# Patient Record
Sex: MALE | Race: WHITE | URBAN - METROPOLITAN AREA
[De-identification: names, ages, dates, MRNs, and addresses within clinical notes are randomized per-mention and may not be internally consistent; named-entity substitution may affect disease eponyms.]

---

## 2017-08-01 ENCOUNTER — TRANSFERRED RECORDS (OUTPATIENT)
Dept: HEALTH INFORMATION MANAGEMENT | Facility: CLINIC | Age: 47
End: 2017-08-01

## 2017-09-18 ENCOUNTER — OFFICE VISIT (OUTPATIENT)
Dept: FAMILY MEDICINE | Facility: CLINIC | Age: 47
End: 2017-09-18

## 2017-09-18 VITALS
RESPIRATION RATE: 16 BRPM | BODY MASS INDEX: 30.67 KG/M2 | HEART RATE: 67 BPM | WEIGHT: 235 LBS | SYSTOLIC BLOOD PRESSURE: 137 MMHG | DIASTOLIC BLOOD PRESSURE: 82 MMHG

## 2017-09-18 DIAGNOSIS — R00.2 PALPITATIONS: ICD-10-CM

## 2017-09-18 DIAGNOSIS — R91.1 LUNG NODULE: ICD-10-CM

## 2017-09-18 DIAGNOSIS — R00.2 PALPITATIONS: Primary | ICD-10-CM

## 2017-09-18 LAB
ALBUMIN SERPL-MCNC: 3.9 G/DL (ref 3.4–5)
ALP SERPL-CCNC: 60 U/L (ref 40–150)
ALT SERPL W P-5'-P-CCNC: 37 U/L (ref 0–70)
ANION GAP SERPL CALCULATED.3IONS-SCNC: 5 MMOL/L (ref 3–14)
AST SERPL W P-5'-P-CCNC: 22 U/L (ref 0–45)
BASOPHILS # BLD AUTO: 0 10E9/L (ref 0–0.2)
BASOPHILS NFR BLD AUTO: 0.8 %
BILIRUB SERPL-MCNC: 0.7 MG/DL (ref 0.2–1.3)
BUN SERPL-MCNC: 14 MG/DL (ref 7–30)
CALCIUM SERPL-MCNC: 8.6 MG/DL (ref 8.5–10.1)
CHLORIDE SERPL-SCNC: 104 MMOL/L (ref 94–109)
CHOLEST SERPL-MCNC: 157 MG/DL
CO2 SERPL-SCNC: 28 MMOL/L (ref 20–32)
CREAT SERPL-MCNC: 0.94 MG/DL (ref 0.66–1.25)
DIFFERENTIAL METHOD BLD: NORMAL
EOSINOPHIL # BLD AUTO: 0.2 10E9/L (ref 0–0.7)
EOSINOPHIL NFR BLD AUTO: 4 %
ERYTHROCYTE [DISTWIDTH] IN BLOOD BY AUTOMATED COUNT: 12.4 % (ref 10–15)
GFR SERPL CREATININE-BSD FRML MDRD: 87 ML/MIN/1.7M2
GLUCOSE SERPL-MCNC: 101 MG/DL (ref 70–99)
HCT VFR BLD AUTO: 43.8 % (ref 40–53)
HDLC SERPL-MCNC: 58 MG/DL
HGB BLD-MCNC: 14.7 G/DL (ref 13.3–17.7)
IMM GRANULOCYTES # BLD: 0 10E9/L (ref 0–0.4)
IMM GRANULOCYTES NFR BLD: 0.2 %
LDLC SERPL CALC-MCNC: 66 MG/DL
LYMPHOCYTES # BLD AUTO: 1.6 10E9/L (ref 0.8–5.3)
LYMPHOCYTES NFR BLD AUTO: 33.1 %
MCH RBC QN AUTO: 31.5 PG (ref 26.5–33)
MCHC RBC AUTO-ENTMCNC: 33.6 G/DL (ref 31.5–36.5)
MCV RBC AUTO: 94 FL (ref 78–100)
MONOCYTES # BLD AUTO: 0.6 10E9/L (ref 0–1.3)
MONOCYTES NFR BLD AUTO: 12.7 %
NEUTROPHILS # BLD AUTO: 2.3 10E9/L (ref 1.6–8.3)
NEUTROPHILS NFR BLD AUTO: 49.2 %
NONHDLC SERPL-MCNC: 99 MG/DL
NRBC # BLD AUTO: 0 10*3/UL
NRBC BLD AUTO-RTO: 0 /100
PLATELET # BLD AUTO: 231 10E9/L (ref 150–450)
POTASSIUM SERPL-SCNC: 4.4 MMOL/L (ref 3.4–5.3)
PROT SERPL-MCNC: 7.9 G/DL (ref 6.8–8.8)
RBC # BLD AUTO: 4.67 10E12/L (ref 4.4–5.9)
SODIUM SERPL-SCNC: 138 MMOL/L (ref 133–144)
TRIGL SERPL-MCNC: 162 MG/DL
TSH SERPL DL<=0.005 MIU/L-ACNC: 1.57 MU/L (ref 0.4–4)
WBC # BLD AUTO: 4.7 10E9/L (ref 4–11)

## 2017-09-18 ASSESSMENT — PAIN SCALES - GENERAL: PAINLEVEL: NO PAIN (0)

## 2017-09-18 NOTE — NURSING NOTE
EKG performed  Results to provider  Patient tolerated well. Alia Smith CMA at 10:09 AM on 9/18/2017
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
## 2017-09-18 NOTE — PATIENT INSTRUCTIONS
Primary Care Center Medication Refill Request Information:  * Please contact your pharmacy regarding ANY request for medication refills.  ** [facility] Prescription Fax = [phone]  * Please allow 3 business days for routine medication refills.  * Please allow 5 business days for controlled substance medication refills.     Primary Care Center Test Result notification information:  *You will be notified with in 7-10 days of your appointment day regarding the results of your test.  If you are on MyChart you will be notified as soon as the provider has reviewed the results and signed off on them.    Primary Care Center [phone]     ECHO DEPARTMENT  (EXERCISE)    Name: David Miller  YOB: 1970  MRN: [id number]  DATE:     TIME:     DOES PATIENT HAVE A DEFIBRILLATOR?  No  DOES PATIENT HAVE A PACEMAKER? No  IS PATIENT TAKING ANY BETA-BLOCKERS? No    PREP INFO:        NOTHING TO EAT OR DRINK 3 HOURS PRIOR.    DO NOT TAKE NITRATES ON THE DAY OF YOUR TEST. DO NOT WEAR YOUR NITRO-PATCH.    NO ALCOHOL, SMOKING, OR OTHER TOBACCO FOR 12 HOURS BEFORE THE TEST    WEAR COMFORTABLE CLOTHES AND COMFORTABLE SHOES    STOP BETA-BLOCKERS THE DAY BEFORE AND THE MORNING OF PROCEDURE.      QUESTIONS OR NEED TO RESCHEDULE: CALL [phone]

## 2017-09-18 NOTE — TELEPHONE ENCOUNTER
1.  Date/reason for appt: 17 - Lung Nodules    2.  Referring provider: Dr Jd Del Valle      3.  Call to patient (Yes / No - short description): No - scheduled with Manda from [facility]    4.  Previous care at / records requested from: [facility] - records received & scanned into chart    5.  Recent Imagin17 Heart Scan - report with pictures scanned into chart

## 2017-09-18 NOTE — MR AVS SNAPSHOT
After Visit Summary   9/18/2017    David Miller    MRN: [id number]           Patient Information     Date Of Birth          1970        Visit Information        Provider Department      9/18/2017 8:35 AM Jd Land MD [facility]        Today's Diagnoses     Palpitations    -  1    Lung nodule          Care Instructions    Primary Care Center Medication Refill Request Information:  * Please contact your pharmacy regarding ANY request for medication refills.  ** [facility] Prescription Fax = [phone]  * Please allow 3 business days for routine medication refills.  * Please allow 5 business days for controlled substance medication refills.     Primary Care Center Test Result notification information:  *You will be notified with in 7-10 days of your appointment day regarding the results of your test.  If you are on MyChart you will be notified as soon as the provider has reviewed the results and signed off on them.    Primary Care Center [phone]     ECHO DEPARTMENT  (EXERCISE)    Name: David Miller  YOB: 1970  MRN: [id number]  DATE:     TIME:     DOES PATIENT HAVE A DEFIBRILLATOR?  No  DOES PATIENT HAVE A PACEMAKER? No  IS PATIENT TAKING ANY BETA-BLOCKERS? No    PREP INFO:        NOTHING TO EAT OR DRINK 3 HOURS PRIOR.    DO NOT TAKE NITRATES ON THE DAY OF YOUR TEST. DO NOT WEAR YOUR NITRO-PATCH.    NO ALCOHOL, SMOKING, OR OTHER TOBACCO FOR 12 HOURS BEFORE THE TEST    WEAR COMFORTABLE CLOTHES AND COMFORTABLE SHOES    STOP BETA-BLOCKERS THE DAY BEFORE AND THE MORNING OF PROCEDURE.      QUESTIONS OR NEED TO RESCHEDULE: CALL [phone]          Follow-ups after your visit        Additional Services     CARDIOLOGY EVAL ADULT REFERRAL       Your provider has referred you to:  [facility]: [facility] [phone]   [email or web address]    Please be  aware that coverage of these services is subject to the terms and limitations of your health insurance plan.  Call member services at your health plan with any benefit or coverage questions.      Type of Referral:  New EP Consult    Timeframe requested:  Within 1 month    Please bring the following to your appointment:  >>   Any x-rays, CTs or MRIs which have been performed.  Contact the facility where they were done to arrange for  prior to your scheduled appointment.    >>   List of current medications  >>   This referral request   >>   Any documents/labs given to you for this referral            PULMONARY MEDICINE REFERRAL       Your provider has referred you to: [facility]: [facility] [place] [phone]   [email or web address]    Please be aware that coverage of these services is subject to the terms and limitations of your health insurance plan.  Call member services at your health plan with any benefit or coverage questions.      Please bring the following with you to your appointment:    (1) Any X-Rays, CTs or MRIs which have been performed.  Contact the facility where they were done to arrange for  prior to your scheduled appointment.    (2) List of current medications   (3) This referral request   (4) Any documents/labs given to you for this referral                  Your next 10 appointments already scheduled     Sep 18, 2017 10:30 AM CDT   LAB with  LAB    Health Lab ([facility])    [address] [place] [address] [phone]           Patient must bring picture ID. Patient should be prepared to give a urine specimen  Please do not eat 10-12 hours before your appointment if you are coming in fasting for labs on lipids, cholesterol, or glucose (sugar). Pregnant women should follow their Care Team instructions. Water with medications is okay. Do not drink coffee or other fluids. If you  have concerns about taking  your medications, please ask at office or if scheduling via Virtual Web, send a message by clicking on Secure Messaging, Message Your Care Team.            Sep 19, 2017  3:00 PM CDT   (Arrive by 2:45 PM)   HOLTER MONITOR VISIT with  Cvc Monitor Tech, TH   [facility] ([facility])    [address] [place] [id number] [phone]            Sep 20, 2017  1:00 PM CDT   Ech Stress Test with GIANNI NOVAK STRESS ROOM   [facility] Echo ([facility])    [address] [place] [id number] [phone]           1. Please bring or wear a comfortable two-piece outfit and walking shoes. 2. Stop eating 3 hours before the test. You may drink water or juice. 3. Stop all caffeine 12 hours before the test. This includes coffee, tea, soda pop, chocolate and certain medicines (such as Anacin and Excederin). Also avoid decaf coffee and tea, as these contain small amounts of caffeine. 4. No alcohol, smoking or use of other tobacco products for 12 hours before the test. 5. Refer to your provider instructions to see if you need to stop any medications (such as beta-blockers or nitrates) for this test. 6. For patients with diabetes: - If you take insulin, call your diabetes care team. Ask if you should take a   dose the morning of your test. - If you take diabetes medicine by mouth, dont take it on the morning of your test. Bring it with you to take after the test. (If you have questions, call your diabetes care team) 7. When you arrive, please tell us if: - You have diabetes. - You have taken Viagra, Cialis or Levitra in the past 48 hours. 8. For any questions that cannot be answered, please contact the ordering physician            Sep 27, 2017 10:20 AM CDT   (Arrive by 10:05 AM)   NEW LUNG NODULE with Antwan Tomlin MD   [facility] ([facility])    [address]  2nd Floor  [facility] [address]   [phone]            Oct 03, 2017  1:30 PM CDT   (Arrive by 1:15 PM)   New Patient Visit with Elijah Penn MD   [facility] ([facility])    [address]  3rd Floor  [facility] [address]   [phone]            Oct 09, 2017 10:35 AM CDT   (Arrive by 10:20 AM)   Return Visit with Jd Land MD   [facility] ([facility])    [address]  4th Floor  [facility] [address] [phone]              Future tests that were ordered for you today     Open Future Orders        Priority Expected Expires Ordered    CBC with platelets differential Routine 9/18/2017 10/2/2017 9/18/2017    Lipid panel reflex to direct LDL Routine 9/18/2017 10/2/2017 9/18/2017    Comprehensive metabolic panel Routine 9/18/2017 10/2/2017 9/18/2017    TSH with free T4 reflex Routine 9/18/2017 10/2/2017 9/18/2017    Exercise Stress Echocardiogram Routine  9/18/2018 9/18/2017    Holter Monitor 24 hour - Adult Routine  3/17/2018 9/18/2017            Who to contact     Please call your clinic at [phone] to:    Ask questions about your health    Make or cancel appointments    Discuss your medicines    Learn about your test results    Speak to your doctor   If you have compliments or concerns about an experience at your clinic, or if you wish to file a complaint, please contact [facility] Patient Relations at [phone] or email us at [email or web address]         Additional Information About Your Visit        MyChart Information     [employer] gives you secure access to your electronic health record. If you see a primary care provider, you can also send messages to your care team and make appointments. If you have questions, please call your primary care clinic.  If you do not have a primary care provider, please call [phone] and  they will assist you.      [employer] is an electronic gateway that provides easy, online access to your medical records. With [employer], you can request a clinic appointment, read your test results, renew a prescription or communicate with your care team.     To access your existing account, please contact your [facility] or call [phone] for assistance.        Care EveryWhere ID     This is your Care EveryWhere ID. This could be used by other organizations to access your [place] medical records  [id number]        Your Vitals Were     Pulse Respirations BMI (Body Mass Index)             67 16 30.67 kg/m2          Blood Pressure from Last 3 Encounters:   09/18/17 137/82   11/03/15 155/85   08/05/15 124/83    Weight from Last 3 Encounters:   09/18/17 106.6 kg (235 lb)   11/03/15 101.2 kg (223 lb 1.6 oz)   08/05/15 99 kg (218 lb 3.2 oz)              We Performed the Following     CARDIOLOGY EVAL ADULT REFERRAL     EKG Performed in Clinic w/ Provider Reading Fee     PULMONARY MEDICINE REFERRAL          Today's Medication Changes          These changes are accurate as of: 9/18/17 10:23 AM.  If you have any questions, ask your nurse or doctor.               Stop taking these medicines if you haven't already. Please contact your care team if you have questions.     azithromycin 500 MG tablet   Commonly known as:  ZITHROMAX   Stopped by:  Jd Land MD                    Primary Care Provider Office Phone # Fax #    Jd Land [phone]       [address] [address]        Equal Access to Services     [facility] ZITA AH: Hadii carly brambila hadasho Sokristinali, waaxda luqadaha, qaybta kaalmada prieto, lewis sahni. So [facility] [phone].    ATENCIÓN: Si habla español, tiene a landry disposición servicios gratuitos de asistencia lingüística. Llame al [phone].    We comply with applicable federal civil rights laws and  [place] laws. We do not discriminate on the basis of race, color, national origin, age, disability sex, sexual orientation or gender identity.            Thank you!     Thank you for choosing [facility]  for your care. Our goal is always to provide you with excellent care. Hearing back from our patients is one way we can continue to improve our services. Please take a few minutes to complete the written survey that you may receive in the mail after your visit with us. Thank you!             Your Updated Medication List - Protect others around you: Learn how to safely use, store and throw away your medicines at www.disposemymeds.org.          This list is accurate as of: 9/18/17 10:23 AM.  Always use your most recent med list.                   Brand Name Dispense Instructions for use Diagnosis    finasteride 5 MG tablet    PROSCAR    30 tablet    Take 1/4 tab daily    Dysuria

## 2017-09-19 ENCOUNTER — ALLIED HEALTH/NURSE VISIT (OUTPATIENT)
Dept: CARDIOLOGY | Facility: CLINIC | Age: 47
End: 2017-09-19

## 2017-09-19 DIAGNOSIS — R00.2 PALPITATIONS: ICD-10-CM

## 2017-09-19 LAB — INTERPRETATION ECG - MUSE: NORMAL

## 2017-09-19 PROCEDURE — 93226 XTRNL ECG REC<48 HR SCAN A/R: CPT

## 2017-09-19 PROCEDURE — 93225 XTRNL ECG REC<48 HRS REC: CPT | Mod: ZF

## 2017-09-19 PROCEDURE — 93227 XTRNL ECG REC<48 HR R&I: CPT | Performed by: INTERNAL MEDICINE

## 2017-09-19 NOTE — MR AVS SNAPSHOT
After Visit Summary   9/19/2017    David Miller    MRN: [id number]           Patient Information     Date Of Birth          1970        Visit Information        Provider Department      9/19/2017 3:00 PM Tech, [facility] Cvc Monitor, [facility]        Today's Diagnoses     Palpitations           Follow-ups after your visit        Your next 10 appointments already scheduled     Sep 19, 2017  3:00 PM CDT   (Arrive by 2:45 PM)   HOLTER MONITOR VISIT with  Cvc Monitor Tech, [facility] ([facility])    [address] [place] [address]   [phone]            Sep 20, 2017  1:00 PM CDT   Ech Stress Test with GIANNI NOVAK STRESS ROOM   [facility] ([facility])    [address] [place] [address] [phone]           1. Please bring or wear a comfortable two-piece outfit and walking shoes. 2. Stop eating 3 hours before the test. You may drink water or juice. 3. Stop all caffeine 12 hours before the test. This includes coffee, tea, soda pop, chocolate and certain medicines (such as Anacin and Excederin). Also avoid decaf coffee and tea, as these contain small amounts of caffeine. 4. No alcohol, smoking or use of other tobacco products for 12 hours before the test. 5. Refer to your provider instructions to see if you need to stop any medications (such as beta-blockers or nitrates) for this test. 6. For patients with diabetes: - If you take insulin, call your diabetes care team. Ask if you should take a   dose the morning of your test. - If you take diabetes medicine by mouth, dont take it on the morning of your test. Bring it with you to take after the test. (If you have questions, call your diabetes care team) 7. When you arrive, please tell us if: - You have diabetes. - You have taken Viagra, Cialis or Levitra in the past 48 hours. 8. For any questions that cannot be  answered, please contact the ordering physician            Sep 27, 2017 10:20 AM CDT   (Arrive by 10:05 AM)   NEW LUNG NODULE with Antwan Tomlin MD   [facility] ([facility])    [address]  2nd Floor  [facility] [address] [phone]            Oct 03, 2017  1:30 PM CDT   (Arrive by 1:15 PM)   New Patient Visit with Elijah Penn MD   [facility] ([facility])    [address] [place] [address]  3rd Floor  [facility] [id number] [phone]            Oct 09, 2017 10:35 AM CDT   (Arrive by 10:20 AM)   Return Visit with Jd Land MD   [facility] ([facility])    [address] [place] [address]  [address] [facility] [id number] [phone]              Future tests that were ordered for you today     Open Future Orders        Priority Expected Expires Ordered    Exercise Stress Echocardiogram Routine  9/18/2018 9/18/2017            Who to contact     If you have questions or need follow up information about today's clinic visit or your schedule please contact [facility] directly at [phone].  Normal or non-critical lab and imaging results will be communicated to you by [employer], letter or phone within 4 business days after the clinic has received the results. If you do not hear from us within 7 days, please contact the clinic through [employer] or phone. If you have a critical or abnormal lab result, we will notify you by phone as soon as possible.  Submit refill requests through [employer] or call your pharmacy and they will forward the refill request to us. Please allow 3 business days for your refill to be completed.          Additional Information About Your Visit        [employer] Information     [employer] gives you secure access to your electronic health record. If you see a primary care provider, you can also send messages to your care team and make  appointments. If you have questions, please call your primary care clinic.  If you do not have a primary care provider, please call [phone] and they will assist you.        Care EveryWhere ID     This is your Care EveryWhere ID. This could be used by other organizations to access your [place] medical records  [id number]         Blood Pressure from Last 3 Encounters:   09/18/17 137/82   11/03/15 155/85   08/05/15 124/83    Weight from Last 3 Encounters:   09/18/17 106.6 kg (235 lb)   11/03/15 101.2 kg (223 lb 1.6 oz)   08/05/15 99 kg (218 lb 3.2 oz)              We Performed the Following     Holter Monitor 24 hour - Adult        Primary Care Provider Office Phone # Fax #    Jd Land [phone]       [address] [address]        Equal Access to Services     [facility] : Hadii aad ku hadasho Soomaali, waaxda luqadaha, qaybta kaalmada adeegyada, waxay genaroin hayalvaron alberta english . So [facility] [phone].    ATENCIÓN: Si habla español, tiene a landry disposición servicios gratuitos de asistencia lingüística. Llame al [phone].    We comply with applicable federal civil rights laws and [place] laws. We do not discriminate on the basis of race, color, national origin, age, disability sex, sexual orientation or gender identity.            Thank you!     Thank you for choosing [facility]  for your care. Our goal is always to provide you with excellent care. Hearing back from our patients is one way we can continue to improve our services. Please take a few minutes to complete the written survey that you may receive in the mail after your visit with us. Thank you!             Your Updated Medication List - Protect others around you: Learn how to safely use, store and throw away your medicines at www.disposemymeds.org.          This list is accurate as of: 9/19/17  2:53 PM.  Always use your most recent med list.                   Brand Name Dispense  Instructions for use Diagnosis    finasteride 5 MG tablet    PROSCAR    30 tablet    Take 1/4 tab daily    Dysuria

## 2017-09-19 NOTE — NURSING NOTE
Per David Victor patient to have 24 hour holter monitor placed.  Diagnosis: Palpitations  Monitor placed: Yes  Patient Instructed: Yes  Patient verbalized understanding: Yes  Holter # 8  Card ID: [id number]  Placed By: Na Lassiter MA

## 2017-09-19 NOTE — LETTER
Patient:  David Miller  :   1970  MRN:     [id number]        Mr. David Miller  [address]   [facility] [address]        2017    Dear Mr. Miller,    Thank you for choosing the [facility] for your healthcare needs.  We appreciate the opportunity to serve you.    The following are your recent test results.     Holter monitor shows nothing of significance, which is good     Results for orders placed or performed in visit on 17   Holter Monitor 24 hour - [facility]  [address]  3rd Floor  [facility] [address]  [phone]  2017      Patient:  David Miller  Chart: [id number]  :  1970  Age:  46 year old  Sex:  male       Procedure:  Holter Monitor Placed: please see scanned document for result once interpretation is completed. 24 hour        Technician performing hook-up:  Na Lassiter       Please contact your provider if you have any questions or concerns.  We look forward to serving your needs in the future.      Sincerely,    Dr. Land/castro

## 2017-09-20 ENCOUNTER — RADIANT APPOINTMENT (OUTPATIENT)
Dept: CARDIOLOGY | Facility: CLINIC | Age: 47
End: 2017-09-20
Attending: FAMILY MEDICINE

## 2017-09-20 DIAGNOSIS — R00.2 PALPITATIONS: ICD-10-CM

## 2017-09-20 RX ADMIN — Medication 5 ML: at 13:45

## 2017-09-27 ENCOUNTER — PRE VISIT (OUTPATIENT)
Dept: PULMONOLOGY | Facility: CLINIC | Age: 47
End: 2017-09-27

## 2017-09-27 ENCOUNTER — OFFICE VISIT (OUTPATIENT)
Dept: PULMONOLOGY | Facility: CLINIC | Age: 47
End: 2017-09-27
Attending: INTERNAL MEDICINE

## 2017-09-27 VITALS
TEMPERATURE: 97.7 F | WEIGHT: 238.1 LBS | BODY MASS INDEX: 31.56 KG/M2 | RESPIRATION RATE: 16 BRPM | SYSTOLIC BLOOD PRESSURE: 121 MMHG | HEIGHT: 73 IN | DIASTOLIC BLOOD PRESSURE: 75 MMHG | OXYGEN SATURATION: 97 % | HEART RATE: 65 BPM

## 2017-09-27 DIAGNOSIS — R91.1 LUNG NODULE: ICD-10-CM

## 2017-09-27 PROCEDURE — 99212 OFFICE O/P EST SF 10 MIN: CPT | Mod: ZF

## 2017-09-27 ASSESSMENT — PAIN SCALES - GENERAL: PAINLEVEL: NO PAIN (0)

## 2017-09-27 NOTE — NURSING NOTE
"Oncology Rooming Note    September 27, 2017 10:18 AM   David Miller is a 46 year old male who presents for:    Chief Complaint   Patient presents with     Oncology Clinic Visit     Lung Nodule- New      Initial Vitals: /75  Pulse 65  Temp 97.7  F (36.5  C) (Oral)  Resp 16  Ht 1.864 m (6' 1.39\")  Wt 108 kg (238 lb 1.6 oz)  SpO2 97%  BMI 31.08 kg/m2 Estimated body mass index is 31.08 kg/(m^2) as calculated from the following:    Height as of this encounter: 1.864 m (6' 1.39\").    Weight as of this encounter: 108 kg (238 lb 1.6 oz). Body surface area is 2.36 meters squared.  No Pain (0) Comment: Data Unavailable   No LMP for male patient.  Allergies reviewed: Yes  Medications reviewed: Yes    Medications: Medication refills not needed today.  Pharmacy name entered into [employer]:    [facility] #[id number]  [facility] #[id number] - [address]    Clinical concerns: no new concerns     6 minutes for nursing intake (face to face time)     Corina Gardner CMA    Patient was offered a flu vaccine today but declined.    "

## 2017-09-27 NOTE — TELEPHONE ENCOUNTER
9/27/17 - I called & spoke with Mariann at [facility] again first thing this morning when I didn't see the imaging in PACS. She said that they're still having intermittent issues pushing images, and will try to push the imaging again. I asked her if the imaging disc was ready for  so I could send a  ASA, she informed me that they were just now going to create the imaging disc I requested yesterday. I will send a  as soon as it is ready.    9/26/17 - Faxed 3rd request to [facility] for imaging. Also called [facility] & spoke with Mariann who stated that they are still having issues pushing images, but the issue should be resolved by 6pm. I asked her to create an imaging disc and I will send a  first thing in the morning if we still haven't received imaging in PACS. She will make sure imaging is pushed by 730am. I will check status first thing in the morning. Message & email sent to Livier & Verónica.

## 2017-09-27 NOTE — LETTER
9/27/2017       RE: David Miller  [address]   [place] [address]     Dear Colleague,    Thank you for referring your patient, David Miller, to the [facility]IC at [facility]. Please see a copy of my visit note below.    [facility]  Lung Nodule Clinic Note  September 27, 2017    Chief complaint:  David Miller is a 46 year old male seen in the Pulmonary Clinic  for   Chief Complaint   Patient presents with     Oncology Clinic Visit     Lung Nodule- New      Assessment and Plan:  #1 indeterminate pulmonary nodules seen on cardiac CT scan. We discussed possible etiologies of pulmonary nodules and possible need for surveillance in size.Patient has history of smoking for 15 years pack-a-day on average and quit 5 years ago. He also worked in a [employer] business and exposed to secondhand smoke also. He has no history upon the problems in him or his family. No history of exposure to chemicals, radiation, asbestos.    We will obtain a dedicated CT scan of the chest today and call him back if further surveillance CT is needed or not.    CT reviewed and no need for further study based on Fleischner Criteria.     I spent more than 30 minutes face to face and greater than 50% of time was for counseling and coordination of care about pulm nodules.    History of Present Illness:  This is a 46 years old gentleman recently had heart calcium scoring CT scan revealing a 5 mm pulmonary nodule in the left upper lobe. No other respiratory problems or he has no respiratory symptoms. He is here today to discuss his CT findings.    Exposure history: Asbestos;  No , TB;  No , Radiation;   No     Allergies   Allergen Reactions     Penicillins Rash        No past medical history on file.     No past surgical history on file.     Social History     Social History     Marital status:      Spouse name: N/A     Number of children: N/A     Years of  education: N/A     Occupational History     Not on file.     Social History Main Topics     Smoking status: Former Smoker     Quit date: 7/20/2012     Smokeless tobacco: Never Used     Alcohol use Not on file     Drug use: Not on file     Sexual activity: Not on file     Other Topics Concern     Not on file     Social History Narrative        No family history on file.     Immunization History   Administered Date(s) Administered     HEPA 11/03/2015     Tdap (Adacel,Boostrix) 09/14/2009     Typhoid IM 11/03/2015       Current Outpatient Prescriptions   Medication Sig     finasteride (PROSCAR) 5 MG tablet Take 1/4 tab daily     No current facility-administered medications for this visit.         Review of Systems:  I have done 10 points of review systems and pertinent findings are  ,otherwise negative.    Physical examination  Constitutional: Alert, oriented, not in distress  Vitals: B/P: 121/75, T: 97.7, P: 65, R: 16  Eyes: No icterus, nystagmus, pupils isocoric   Musculoskeletal: Normal muscle mass, no dephormity  Integumentary:  No rash, normal texture and turgor, no edema  Neurological: Alert, orientedx3, no motor deficits, cranial nerves grossly normal  Psychiatric:  Mood and affect are appropriate with insight into his/her medical condition  Hematologic/Immunologic/Lymphatic: No bruise, no lymph node enargement     Data:  Lab Results   Component Value Date    WBC 4.7 09/18/2017     Lab Results   Component Value Date    RBC 4.67 09/18/2017     Lab Results   Component Value Date    HGB 14.7 09/18/2017     Lab Results   Component Value Date    HCT 43.8 09/18/2017     Lab Results   Component Value Date    MCV 94 09/18/2017     Lab Results   Component Value Date    MCH 31.5 09/18/2017     Lab Results   Component Value Date    MCHC 33.6 09/18/2017     Lab Results   Component Value Date    RDW 12.4 09/18/2017     Lab Results   Component Value Date     09/18/2017       Lab Results   Component Value Date      09/18/2017      Lab Results   Component Value Date    POTASSIUM 4.4 09/18/2017     Lab Results   Component Value Date    CHLORIDE 104 09/18/2017     Lab Results   Component Value Date    DIANA 8.6 09/18/2017     Lab Results   Component Value Date    CO2 28 09/18/2017     Lab Results   Component Value Date    BUN 14 09/18/2017     Lab Results   Component Value Date    CR 0.94 09/18/2017     Lab Results   Component Value Date     09/18/2017       Thank you for allowing me participate in the care of David Miller.    TRACE Tomlin MD

## 2017-09-27 NOTE — TELEPHONE ENCOUNTER
9/27/17 0948am - Imaging received via push from [facility] & is viewable in PACS. Message sent to Verónica Maier.

## 2017-09-27 NOTE — PROGRESS NOTES
[facility]  Lung Nodule Clinic Note  September 27, 2017    Chief complaint:  David Miller is a 46 year old male seen in the Pulmonary Clinic  for   Chief Complaint   Patient presents with     Oncology Clinic Visit     Lung Nodule- New      Assessment and Plan:  #1 indeterminate pulmonary nodules seen on cardiac CT scan. We discussed possible etiologies of pulmonary nodules and possible need for surveillance in size.Patient has history of smoking for 15 years pack-a-day on average and quit 5 years ago. He also worked in a [employer] business and exposed to secondhand smoke also. He has no history upon the problems in him or his family. No history of exposure to chemicals, radiation, asbestos.    We will obtain a dedicated CT scan of the chest today and call him back if further surveillance CT is needed or not.    CT reviewed and no need for further study based on Fleischner Criteria.     I spent more than 30 minutes face to face and greater than 50% of time was for counseling and coordination of care about pulm nodules.    History of Present Illness:  This is a 46 years old gentleman recently had heart calcium scoring CT scan revealing a 5 mm pulmonary nodule in the left upper lobe. No other respiratory problems or he has no respiratory symptoms. He is here today to discuss his CT findings.    Exposure history: Asbestos;  No , TB;  No , Radiation;   No     Allergies   Allergen Reactions     Penicillins Rash        No past medical history on file.     No past surgical history on file.     Social History     Social History     Marital status:      Spouse name: N/A     Number of children: N/A     Years of education: N/A     Occupational History     Not on file.     Social History Main Topics     Smoking status: Former Smoker     Quit date: 7/20/2012     Smokeless tobacco: Never Used     Alcohol use Not on file     Drug use: Not on file     Sexual activity: Not on file     Other Topics Concern     Not on file      Social History Narrative        No family history on file.     Immunization History   Administered Date(s) Administered     HEPA 11/03/2015     Tdap (Adacel,Boostrix) 09/14/2009     Typhoid IM 11/03/2015       Current Outpatient Prescriptions   Medication Sig     finasteride (PROSCAR) 5 MG tablet Take 1/4 tab daily     No current facility-administered medications for this visit.         Review of Systems:  I have done 10 points of review systems and pertinent findings are  ,otherwise negative.    Physical examination  Constitutional: Alert, oriented, not in distress  Vitals: B/P: 121/75, T: 97.7, P: 65, R: 16  Eyes: No icterus, nystagmus, pupils isocoric   Musculoskeletal: Normal muscle mass, no dephormity  Integumentary:  No rash, normal texture and turgor, no edema  Neurological: Alert, orientedx3, no motor deficits, cranial nerves grossly normal  Psychiatric:  Mood and affect are appropriate with insight into his/her medical condition  Hematologic/Immunologic/Lymphatic: No bruise, no lymph node enargement     Data:  Lab Results   Component Value Date    WBC 4.7 09/18/2017     Lab Results   Component Value Date    RBC 4.67 09/18/2017     Lab Results   Component Value Date    HGB 14.7 09/18/2017     Lab Results   Component Value Date    HCT 43.8 09/18/2017     Lab Results   Component Value Date    MCV 94 09/18/2017     Lab Results   Component Value Date    MCH 31.5 09/18/2017     Lab Results   Component Value Date    MCHC 33.6 09/18/2017     Lab Results   Component Value Date    RDW 12.4 09/18/2017     Lab Results   Component Value Date     09/18/2017       Lab Results   Component Value Date     09/18/2017      Lab Results   Component Value Date    POTASSIUM 4.4 09/18/2017     Lab Results   Component Value Date    CHLORIDE 104 09/18/2017     Lab Results   Component Value Date    DIANA 8.6 09/18/2017     Lab Results   Component Value Date    CO2 28 09/18/2017     Lab Results   Component Value Date     BUN 14 09/18/2017     Lab Results   Component Value Date    CR 0.94 09/18/2017     Lab Results   Component Value Date     09/18/2017       Thank you for allowing me participate in the care of David Miller.    TRACE Tomlin MD

## 2017-09-27 NOTE — MR AVS SNAPSHOT
After Visit Summary   9/27/2017    David Miller    MRN: [id number]           Patient Information     Date Of Birth          1970        Visit Information        Provider Department      9/27/2017 10:20 AM Antwan Tomlin MD [facility]        Today's Diagnoses     Lung nodule           Follow-ups after your visit        Your next 10 appointments already scheduled     Oct 03, 2017  1:30 PM CDT   (Arrive by 1:15 PM)   New Patient Visit with Elijah Penn MD   [facility] ([facility])    [address] [place] [address]  3rd [facility] [id number] [phone]            Oct 09, 2017 10:35 AM CDT   (Arrive by 10:20 AM)   Return Visit with Jd Land MD   [facility] ([facility])    [address] [place] [address]  4th [facility] [address] [phone]              Who to contact     If you have questions or need follow up information about today's clinic visit or your schedule please contact [facility] directly at [phone].  Normal or non-critical lab and imaging results will be communicated to you by "[employer]hart, letter or phone within 4 business days after the clinic has received the results. If you do not hear from us within 7 days, please contact the clinic through "[employer]hart or phone. If you have a critical or abnormal lab result, we will notify you by phone as soon as possible.  Submit refill requests through [employer] or call your pharmacy and they will forward the refill request to us. Please allow 3 business days for your refill to be completed.          Additional Information About Your Visit        MyChart Information     [employer] gives you secure access to your electronic health record. If you see a primary care provider, you can also send messages to your care team and make appointments. If you have questions, please call your primary care  "clinic.  If you do not have a primary care provider, please call [phone] and they will assist you.        Care EveryWhere ID     This is your Care EveryWhere ID. This could be used by other organizations to access your [place] medical records  [id number]        Your Vitals Were     Pulse Temperature Respirations Height Pulse Oximetry BMI (Body Mass Index)    65 97.7  F (36.5  C) (Oral) 16 1.864 m (6' 1.39\") 97% 31.08 kg/m2       Blood Pressure from Last 3 Encounters:   09/27/17 121/75   09/18/17 137/82   11/03/15 155/85    Weight from Last 3 Encounters:   09/27/17 108 kg (238 lb 1.6 oz)   09/18/17 106.6 kg (235 lb)   11/03/15 101.2 kg (223 lb 1.6 oz)               Primary Care Provider Office Phone # Fax #    Jd Land [phone]       [address] [address]        Equal Access to Services     [facility]: Hadii carly ku hadasho Soomaali, waaxda luqadaha, qaybta kaalmada adeemeraldyadanni, lewis egnlish . So [facility] [phone].    ATENCIÓN: Si habla español, tiene a landry disposición servicios gratuitos de asistencia lingüística. Llame al [phone].    We comply with applicable federal civil rights laws and [place] laws. We do not discriminate on the basis of race, color, national origin, age, disability, sex, sexual orientation, or gender identity.            Thank you!     Thank you for choosing [facility]  for your care. Our goal is always to provide you with excellent care. Hearing back from our patients is one way we can continue to improve our services. Please take a few minutes to complete the written survey that you may receive in the mail after your visit with us. Thank you!             Your Updated Medication List - Protect others around you: Learn how to safely use, store and throw away your medicines at www.disposemymeds.org.          This list is accurate as of: 9/27/17 11:59 PM.  Always use your most recent " med list.                   Brand Name Dispense Instructions for use Diagnosis    finasteride 5 MG tablet    PROSCAR    30 tablet    Take 1/4 tab daily    Dysuria

## 2017-09-28 ENCOUNTER — TELEPHONE (OUTPATIENT)
Dept: SURGERY | Facility: CLINIC | Age: 47
End: 2017-09-28

## 2017-09-28 NOTE — TELEPHONE ENCOUNTER
Pt seen by Dr. Tomlin in nodule clinic 09/27/17. Scan prior to the appt was a heart scan that did not show complete images of lungs.  We obtained a dedicated chest CT after his appt with Dr. Tomlin.  The results were reviewed and per radiology report nodules sub 6 centimeter.  Given the pt is not considered to be high risk for lung ca--per guideline recommendations, no further serial CT follow up is needed.    Spoke with pt. Questions answered.    Verónica Angel, APRN, CNP  Thoracic and Forgut Surgery  [facility]

## 2017-10-02 ENCOUNTER — PRE VISIT (OUTPATIENT)
Dept: CARDIOLOGY | Facility: CLINIC | Age: 47
End: 2017-10-02

## 2017-10-03 ENCOUNTER — OFFICE VISIT (OUTPATIENT)
Dept: CARDIOLOGY | Facility: CLINIC | Age: 47
End: 2017-10-03
Attending: INTERNAL MEDICINE

## 2017-10-03 VITALS
HEART RATE: 73 BPM | DIASTOLIC BLOOD PRESSURE: 86 MMHG | HEIGHT: 74 IN | SYSTOLIC BLOOD PRESSURE: 131 MMHG | OXYGEN SATURATION: 94 % | WEIGHT: 237.5 LBS | BODY MASS INDEX: 30.48 KG/M2

## 2017-10-03 DIAGNOSIS — I49.3 PVC'S (PREMATURE VENTRICULAR CONTRACTIONS): Primary | ICD-10-CM

## 2017-10-03 DIAGNOSIS — R00.2 PALPITATIONS: ICD-10-CM

## 2017-10-03 PROCEDURE — 99212 OFFICE O/P EST SF 10 MIN: CPT | Mod: ZF

## 2017-10-03 PROCEDURE — 99204 OFFICE O/P NEW MOD 45 MIN: CPT | Mod: ZP | Performed by: INTERNAL MEDICINE

## 2017-10-03 ASSESSMENT — PAIN SCALES - GENERAL: PAINLEVEL: NO PAIN (0)

## 2017-10-03 NOTE — NURSING NOTE
Chief Complaint   Patient presents with     New Patient     consult palpitations, had stress echo & holter done (getting holter report not in chart yet)     Vitals were taken and medications were reconciled.  DANA Guaman  1:17 PM

## 2017-10-03 NOTE — PATIENT INSTRUCTIONS
You were seen at the [facility] clinic today.  You saw Dr. Penn  Here are your Instructions:    1. See us back as needed.      Angie Stinson RN  Nurse Care Coordinator  Office:  [phone] option #1, then #3 & ask for Angie (nurse line)  Fax:  [phone]  After Hours:  [phone]  Appointments:  [phone] option #1, then option #1

## 2017-10-03 NOTE — PROGRESS NOTES
"Chief complaint: Palpitations    HPI:   David Miller is a 46 year old male with no prior cardiac history who presents for evaluation of palpitations.     He was in his usual state of health until 4-5 months ago, when he began experiencing a sensation of pounding/\"skipped beats\" predominantly in his right neck. These episodes occur predictably in recovery from exercise (including running and after sexual intercourse) and in the heat, last several seconds (\"just 1 or 2 beats\"), and resolve spontaneously. He has never experienced palpitations during exercise. He was concerned that these may be dangerous and this has prompted him to stop exercising-- he was previously an active runner. He denies ever experiencing lightheadedness, syncope, or chest pain with these episodes.    Initial workup by his PCP, Dr. Land has included ECG (9/18: sinus, VR 72,  QRS 98 QTc 433), exercise echocardiogram (normal, no arrhythmias reproduced--see below), and Holter monitor (official results pending-- see below.)    He states that palpitations did not occur either during or after exercise echocardiogram. He did have palpitations typical of his usual symptoms during the Holter monitor and noted these in the diary.    He has no personal cardiac history, and no family history of premature CAD, arrhythmia, sudden death, drowning, or single car accidents.      He denies any chest pain, dyspnea at rest or with exertion, PND, orthopnea, peripheral edema, lightheadedness or syncope.       PAST MEDICAL HISTORY:  No past medical history on file.    CURRENT MEDICATIONS:  Current Outpatient Prescriptions   Medication Sig Dispense Refill     finasteride (PROSCAR) 5 MG tablet Take 1/4 tab daily 30 tablet 0       PAST SURGICAL HISTORY:  No past surgical history on file.    ALLERGIES:     Allergies   Allergen Reactions     Penicillins Rash       FAMILY HISTORY:  No family history on file.  No family history of premature CAD or sudden " "death.    SOCIAL HISTORY:  Social History   Substance Use Topics     Smoking status: Former Smoker     Quit date: 7/20/2012     Smokeless tobacco: Never Used     Alcohol use Not on file       ROS:   A comprehensive 14 point review of systems is negative other than as mentioned in HPI.    Exam:  /86 (BP Location: Left arm, Patient Position: Chair, Cuff Size: Adult Large)  Pulse 73  Ht 1.88 m (6' 2\")  Wt 107.7 kg (237 lb 8 oz)  SpO2 94%  BMI 30.49 kg/m2  GENERAL APPEARANCE: healthy, alert and no distress  EYES: no icterus, no xanthelasmas  ENT: normal palate, mucosa moist, no central cyanosis  NECK: no adenopathy, no asymmetry, masses, or scars, thyroid normal to palpation and no bruits, JVP not elevated  RESPIRATORY: lungs clear to auscultation - no rales, rhonchi or wheezes, no use of accessory muscles, no retractions, respirations are unlabored, normal respiratory rate  CARDIOVASCULAR: regular rhythm, normal S1 with physiologic split S2, no S3 or S4 and no murmur, click or rub, precordium quiet with normal PMI.  GI: soft, non tender, without hepatosplenomegaly, no masses palpable, bowel sounds normal, aorta not enlarged by palpation, no abdominal bruits  EXTREMITIES: peripheral pulses normal, no edema, no bruits  NEURO: alert and oriented to person/place/time, normal speech, gait and affect  VASC: Radial, femoral, dorsalis pedis and posterior tibialis pulses 2+ bilaterally.  SKIN: no ecchymoses, no rashes.  PSYCH: cooperative, affect appropriate.     Labs:  Reviewed.     I personally reviewed and interpreted:  ECG 9/18/17: Sinus, VR 72  QRS 98 QTc 433.     Exercise Stress echo 9/20/17: Normal exercise echocardiogram without evidence of ischemia. Resting LVEF=60-65%, increased appropriately to >70%. Normal RV function. No significant valvular abnormalities. Normal HR (peak , target 150) and BP response. Normal functional capacity (16:03, 225W.) No angina elicited.     Holter monitor 9/2017: " "Sinus, average VR 87 [], 23 monomorphic PVCs (<1%); no significant tachy/bradyarrhythmias or pauses. 1 patient-triggered event for \"skipped beats\" which correlates to sinus rhythm with 1 isolated PVC.     Obtained and reviewed outside records:  Coronary calcium score ([facility]ott 8/1/17): Agatston score 0. Incidental finding of pulmonary nodule (PCP following.)      Assessment and Plan:   1. PVCs, newly diagnosed:   Rare (<1%) symptomatic monomorphic PVCs. These correlate with symptoms of palpitations on Holter monitor. No exertional arrhythmia, structural disease, or ischemia on exercise echocardiogram.  Explained the mechanism of PVCs and counseled at length regarding their generally benign nature (as well as warning signs of more serious arrhythmia-- exertional palpitations, presyncope/syncope, protracted episodes of palpitations, palpitations associated with chest pain, dramatic increase in frequency/severity, etc.)   Discussed the potential management options including observation, medications (beta blockers or antiarrhythmics), and ablation. Counseled that given his very low PVC burden, observation would be the recommended therapy in the absence of very severe/intolerable symptoms. Advised that he can and should safely return to exercise.     He is agreeable to observation at this time and understands that if palpitations become more frequent or bothersome, or if he develops any of the warning signs described above, that he should follow up immediately.     Further cardiology follow up can be on an as-needed basis.    >50% of this 60-minute visit were spent with the patient on in-person counseling and discussion regarding PVCs, including prognosis, management options, and warning signs of ventricular tachycardia.     The patient states understanding and is agreeable with plan.     Elijah Penn MD  Cardiology    [facility]  [facility]        "

## 2017-10-03 NOTE — MR AVS SNAPSHOT
After Visit Summary   10/3/2017    Davdi Miller    MRN: [id number]           Patient Information     Date Of Birth          1970        Visit Information        Provider Department      10/3/2017 1:30 PM Elijah Penn MD [facility]        Today's Diagnoses     PVC's (premature ventricular contractions)    -  1    Palpitations          Care Instructions    You were seen at the [facility] today.  You saw Dr. Penn  Here are your Instructions:    1. See us back as needed.      Angie Stinson RN  Nurse Care Coordinator  Office:  [phone] option #1, then #3 & ask for Angie (nurse line)  Fax:  [phone]  After Hours:  [phone]  Appointments:  [phone] option #1, then option #1                        Follow-ups after your visit        Your next 10 appointments already scheduled     Oct 09, 2017 10:35 AM CDT   (Arrive by 10:20 AM)   Return Visit with Jd Land MD   [facility] ([facility])    [address] [place] [id number] [phone]              Who to contact     If you have questions or need follow up information about today's clinic visit or your schedule please contact [facility] directly at [phone].  Normal or non-critical lab and imaging results will be communicated to you by MyChart, letter or phone within 4 business days after the clinic has received the results. If you do not hear from us within 7 days, please contact the clinic through MyChart or phone. If you have a critical or abnormal lab result, we will notify you by phone as soon as possible.  Submit refill requests through [employer] or call your pharmacy and they will forward the refill request to us. Please allow 3 business days for your refill to be completed.          Additional Information About Your Visit        MyChart Information     [employer] gives  "you secure access to your electronic health record. If you see a primary care provider, you can also send messages to your care team and make appointments. If you have questions, please call your primary care clinic.  If you do not have a primary care provider, please call [phone] and they will assist you.        Care EveryWhere ID     This is your Care EveryWhere ID. This could be used by other organizations to access your [facility] medical records  [id number]        Your Vitals Were     Pulse Height Pulse Oximetry BMI (Body Mass Index)          73 1.88 m (6' 2\") 94% 30.49 kg/m2         Blood Pressure from Last 3 Encounters:   10/03/17 131/86   09/27/17 121/75   09/18/17 137/82    Weight from Last 3 Encounters:   10/03/17 107.7 kg (237 lb 8 oz)   09/27/17 108 kg (238 lb 1.6 oz)   09/18/17 106.6 kg (235 lb)              Today, you had the following     No orders found for display       Primary Care Provider Office Phone # Fax #    Jd Land [phone]       [address] [facility] [address]  [facility] [address]        Equal Access to Services     MARNI AHUMADA AH: Hadii aad patty nobleo Sokristinali, waaxda luqadaha, qaybta kaalmada adeegyada, lewis sahni. So [facility] [phone].    ATENCIÓN: Si habla español, tiene a landry disposición servicios gratuitos de asistencia lingüística. Llame al [phone].    We comply with applicable federal civil rights laws and [place] laws. We do not discriminate on the basis of race, color, national origin, age, disability, sex, sexual orientation, or gender identity.            Thank you!     Thank you for choosing [facility]  for your care. Our goal is always to provide you with excellent care. Hearing back from our patients is one way we can continue to improve our services. Please take a few minutes to complete the written survey that you may receive in the mail after your visit with us. Thank you!             Your Updated " Medication List - Protect others around you: Learn how to safely use, store and throw away your medicines at www.disposemymeds.org.          This list is accurate as of: 10/3/17  1:55 PM.  Always use your most recent med list.                   Brand Name Dispense Instructions for use Diagnosis    finasteride 5 MG tablet    PROSCAR    30 tablet    Take 1/4 tab daily    Dysuria

## 2017-10-03 NOTE — LETTER
"10/3/2017      RE: David Miller  [address]   [facility] [address]       Dear Colleague,    Thank you for the opportunity to participate in the care of your patient, David Miller, at the [facility]. Please see a copy of my visit note below.    Chief complaint: Palpitations    HPI:   David Miller is a 46 year old male with no prior cardiac history who presents for evaluation of palpitations.     He was in his usual state of health until 4-5 months ago, when he began experiencing a sensation of pounding/\"skipped beats\" predominantly in his right neck. These episodes occur predictably in recovery from exercise (including running and after sexual intercourse) and in the heat, last several seconds (\"just 1 or 2 beats\"), and resolve spontaneously. He has never experienced palpitations during exercise. He was concerned that these may be dangerous and this has prompted him to stop exercising-- he was previously an active runner. He denies ever experiencing lightheadedness, syncope, or chest pain with these episodes.    Initial workup by his PCP, Dr. Land has included ECG (9/18: sinus, VR 72,  QRS 98 QTc 433), exercise echocardiogram (normal, no arrhythmias reproduced--see below), and Holter monitor (official results pending-- see below.)    He states that palpitations did not occur either during or after exercise echocardiogram. He did have palpitations typical of his usual symptoms during the Holter monitor and noted these in the diary.    He has no personal cardiac history, and no family history of premature CAD, arrhythmia, sudden death, drowning, or single car accidents.      He denies any chest pain, dyspnea at rest or with exertion, PND, orthopnea, peripheral edema, lightheadedness or syncope.       PAST MEDICAL HISTORY:  No past medical history on file.    CURRENT MEDICATIONS:  Current Outpatient Prescriptions " "  Medication Sig Dispense Refill     finasteride (PROSCAR) 5 MG tablet Take 1/4 tab daily 30 tablet 0       PAST SURGICAL HISTORY:  No past surgical history on file.    ALLERGIES:     Allergies   Allergen Reactions     Penicillins Rash       FAMILY HISTORY:  No family history on file.  No family history of premature CAD or sudden death.    SOCIAL HISTORY:  Social History   Substance Use Topics     Smoking status: Former Smoker     Quit date: 7/20/2012     Smokeless tobacco: Never Used     Alcohol use Not on file       ROS:   A comprehensive 14 point review of systems is negative other than as mentioned in HPI.    Exam:  /86 (BP Location: Left arm, Patient Position: Chair, Cuff Size: Adult Large)  Pulse 73  Ht 1.88 m (6' 2\")  Wt 107.7 kg (237 lb 8 oz)  SpO2 94%  BMI 30.49 kg/m2  GENERAL APPEARANCE: healthy, alert and no distress  EYES: no icterus, no xanthelasmas  ENT: normal palate, mucosa moist, no central cyanosis  NECK: no adenopathy, no asymmetry, masses, or scars, thyroid normal to palpation and no bruits, JVP not elevated  RESPIRATORY: lungs clear to auscultation - no rales, rhonchi or wheezes, no use of accessory muscles, no retractions, respirations are unlabored, normal respiratory rate  CARDIOVASCULAR: regular rhythm, normal S1 with physiologic split S2, no S3 or S4 and no murmur, click or rub, precordium quiet with normal PMI.  GI: soft, non tender, without hepatosplenomegaly, no masses palpable, bowel sounds normal, aorta not enlarged by palpation, no abdominal bruits  EXTREMITIES: peripheral pulses normal, no edema, no bruits  NEURO: alert and oriented to person/place/time, normal speech, gait and affect  VASC: Radial, femoral, dorsalis pedis and posterior tibialis pulses 2+ bilaterally.  SKIN: no ecchymoses, no rashes.  PSYCH: cooperative, affect appropriate.     Labs:  Reviewed.     I personally reviewed and interpreted:  ECG 9/18/17: Sinus, VR 72  QRS 98 QTc 433.     Exercise Stress " "echo 9/20/17: Normal exercise echocardiogram without evidence of ischemia. Resting LVEF=60-65%, increased appropriately to >70%. Normal RV function. No significant valvular abnormalities. Normal HR (peak , target 150) and BP response. Normal functional capacity (16:03, 225W.) No angina elicited.     Holter monitor 9/2017: Sinus, average VR 87 [], 23 monomorphic PVCs (<1%); no significant tachy/bradyarrhythmias or pauses. 1 patient-triggered event for \"skipped beats\" which correlates to sinus rhythm with 1 isolated PVC.     Obtained and reviewed outside records:  Coronary calcium score ([facility] 8/1/17): Agatston score 0. Incidental finding of pulmonary nodule (PCP following.)      Assessment and Plan:   1. PVCs, newly diagnosed:   Rare (<1%) symptomatic monomorphic PVCs. These correlate with symptoms of palpitations on Holter monitor. No exertional arrhythmia, structural disease, or ischemia on exercise echocardiogram.  Explained the mechanism of PVCs and counseled at length regarding their generally benign nature (as well as warning signs of more serious arrhythmia-- exertional palpitations, presyncope/syncope, protracted episodes of palpitations, palpitations associated with chest pain, dramatic increase in frequency/severity, etc.)   Discussed the potential management options including observation, medications (beta blockers or antiarrhythmics), and ablation. Counseled that given his very low PVC burden, observation would be the recommended therapy in the absence of very severe/intolerable symptoms. Advised that he can and should safely return to exercise.     He is agreeable to observation at this time and understands that if palpitations become more frequent or bothersome, or if he develops any of the warning signs described above, that he should follow up immediately.     Further cardiology follow up can be on an as-needed basis.    >50% of this 60-minute visit were spent with the patient on " in-person counseling and discussion regarding PVCs, including prognosis, management options, and warning signs of ventricular tachycardia.     The patient states understanding and is agreeable with plan.     Elijah Penn MD  Cardiology    CC  [facility]

## 2019-11-05 ENCOUNTER — HEALTH MAINTENANCE LETTER (OUTPATIENT)
Age: 49
End: 2019-11-05

## 2020-11-22 ENCOUNTER — HEALTH MAINTENANCE LETTER (OUTPATIENT)
Age: 50
End: 2020-11-22

## 2021-09-19 ENCOUNTER — HEALTH MAINTENANCE LETTER (OUTPATIENT)
Age: 51
End: 2021-09-19

## 2022-01-09 ENCOUNTER — HEALTH MAINTENANCE LETTER (OUTPATIENT)
Age: 52
End: 2022-01-09

## 2022-11-20 ENCOUNTER — HEALTH MAINTENANCE LETTER (OUTPATIENT)
Age: 52
End: 2022-11-20

## 2023-04-15 ENCOUNTER — HEALTH MAINTENANCE LETTER (OUTPATIENT)
Age: 53
End: 2023-04-15